# Patient Record
Sex: FEMALE | Race: WHITE | Employment: OTHER | URBAN - METROPOLITAN AREA
[De-identification: names, ages, dates, MRNs, and addresses within clinical notes are randomized per-mention and may not be internally consistent; named-entity substitution may affect disease eponyms.]

---

## 2024-10-18 ENCOUNTER — OFFICE VISIT (OUTPATIENT)
Dept: VASCULAR SURGERY | Age: 89
End: 2024-10-18
Payer: MEDICARE

## 2024-10-18 VITALS
BODY MASS INDEX: 21.49 KG/M2 | HEIGHT: 65 IN | SYSTOLIC BLOOD PRESSURE: 144 MMHG | WEIGHT: 129 LBS | DIASTOLIC BLOOD PRESSURE: 74 MMHG

## 2024-10-18 DIAGNOSIS — I83.893 SYMPTOMATIC VARICOSE VEINS OF BOTH LOWER EXTREMITIES: Primary | ICD-10-CM

## 2024-10-18 PROCEDURE — 1090F PRES/ABSN URINE INCON ASSESS: CPT | Performed by: SURGERY

## 2024-10-18 PROCEDURE — 1123F ACP DISCUSS/DSCN MKR DOCD: CPT | Performed by: SURGERY

## 2024-10-18 PROCEDURE — 1036F TOBACCO NON-USER: CPT | Performed by: SURGERY

## 2024-10-18 PROCEDURE — 99203 OFFICE O/P NEW LOW 30 MIN: CPT | Performed by: SURGERY

## 2024-10-18 PROCEDURE — G8484 FLU IMMUNIZE NO ADMIN: HCPCS | Performed by: SURGERY

## 2024-10-18 PROCEDURE — G8427 DOCREV CUR MEDS BY ELIG CLIN: HCPCS | Performed by: SURGERY

## 2024-10-18 PROCEDURE — G8420 CALC BMI NORM PARAMETERS: HCPCS | Performed by: SURGERY

## 2024-10-18 RX ORDER — MULTIVITAMIN WITH IRON
1 TABLET ORAL DAILY
COMMUNITY

## 2024-10-18 ASSESSMENT — ENCOUNTER SYMPTOMS: BACK PAIN: 1

## 2024-10-18 NOTE — PROGRESS NOTES
Maria G Pratt   89 y.o.  lady previously seen by [facility] in 2012 and placed in compression stockings for varicose veins.  Has not regularly worn those stockings until the last 5 years when she began experiencing some stinging pain in her legs.  Use of the stockings seemed to have improved those symptoms and we controlled them.  Presents now because of desire for new stockings.  No history of ulcerations, SVT, dermatitis, bleeding or significant edema.  No intervening venous interventions.  Patient remains very active.    Past Medical History:   Diagnosis Date    Cancer (HCC)     Melanoma    Cholelithiasis     Cold 2-4 WEEKS AGO    Hypertension     Nausea & vomiting     POST -OP    Varicose veins      Past Surgical History:   Procedure Laterality Date    BLADDER REPAIR      CHOLECYSTECTOMY, LAPAROSCOPIC  2/12/2013    MULTI PORT ROBOTIC ASSISTED, INTRA OP CHOLANGIOGRAM    DENTAL SURGERY      EYE SURGERY      GLAUCOMA    SKIN CANCER EXCISION      ARM MELANOMA    TONSILLECTOMY       Allergies   Allergen Reactions    Amoxicillin Shortness Of Breath and Rash    Penicillins Shortness Of Breath and Rash     Current Outpatient Medications   Medication Sig Dispense Refill    Multiple Vitamin (MULTIVITAMIN) TABS tablet Take 1 tablet by mouth daily      calcium carbonate (OSCAL) 500 MG TABS tablet Take 1 tablet by mouth daily      Cholecalciferol (VITAMIN D) 2000 UNITS CAPS capsule Take  by mouth daily.      lisinopril (PRINIVIL;ZESTRIL) 10 MG tablet Take 1 tablet by mouth daily       No current facility-administered medications for this visit.     Social History     Socioeconomic History    Marital status:      Spouse name: Not on file    Number of children: Not on file    Years of education: Not on file    Highest education level: Not on file   Occupational History    Not on file   Tobacco Use    Smoking status: Never    Smokeless tobacco: Not on file   Substance and Sexual Activity    Alcohol use: Yes     Comment: RARELY